# Patient Record
Sex: MALE | Race: OTHER
[De-identification: names, ages, dates, MRNs, and addresses within clinical notes are randomized per-mention and may not be internally consistent; named-entity substitution may affect disease eponyms.]

---

## 2017-08-30 NOTE — NUR
TO BED 1 A 56 YO MALE BIBSELF W C/O CHEST PAIN X 5 HOURS AND R LEG NUMBNESS. 
PATIENT IS AAOX4, AMBULATORY WITH STEADY GAIT, NAD NOTED. VSS. ON CARDIAC AND 
VS MONITORING. GOWNED. COMFORT MEASURES RENDERED. AWAITING FOR ER MD HANNON.

## 2017-08-30 NOTE — NUR
IV removed. Catheter intact and site benign. Pressure and 4x4 applied to site. 
No bleeding noted. Patient discharged to home in stable condition. Written and 
verbal after care instructions given. Patient verbalizes understanding of 
instruction. Patient is ambulatory with steady gait, no further complaints.

## 2023-10-05 ENCOUNTER — HOSPITAL ENCOUNTER (EMERGENCY)
Dept: HOSPITAL 54 - ER | Age: 63
Discharge: HOME | End: 2023-10-05
Payer: COMMERCIAL

## 2023-10-05 VITALS — OXYGEN SATURATION: 99 % | TEMPERATURE: 98.1 F | DIASTOLIC BLOOD PRESSURE: 92 MMHG | SYSTOLIC BLOOD PRESSURE: 137 MMHG

## 2023-10-05 VITALS — WEIGHT: 220 LBS | HEIGHT: 69 IN | BODY MASS INDEX: 32.58 KG/M2

## 2023-10-05 DIAGNOSIS — S09.90XA: Primary | ICD-10-CM
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
## 2023-10-05 DIAGNOSIS — R42: ICD-10-CM
